# Patient Record
Sex: FEMALE | Race: WHITE | Employment: OTHER | URBAN - METROPOLITAN AREA
[De-identification: names, ages, dates, MRNs, and addresses within clinical notes are randomized per-mention and may not be internally consistent; named-entity substitution may affect disease eponyms.]

---

## 2018-02-20 PROBLEM — E55.9 VITAMIN D DEFICIENCY: Status: ACTIVE | Noted: 2018-02-20

## 2018-02-20 PROBLEM — E55.9 VITAMIN D DEFICIENCY: Chronic | Status: ACTIVE | Noted: 2018-02-20

## 2018-03-06 ENCOUNTER — OFFICE VISIT (OUTPATIENT)
Age: 68
End: 2018-03-06

## 2018-03-06 ENCOUNTER — PROCEDURE VISIT (OUTPATIENT)
Age: 68
End: 2018-03-06

## 2018-03-06 ENCOUNTER — HOSPITAL ENCOUNTER (OUTPATIENT)
Dept: GENERAL RADIOLOGY | Age: 68
Discharge: HOME | End: 2018-03-06
Attending: INTERNAL MEDICINE | Admitting: INTERNAL MEDICINE

## 2018-03-06 VITALS
BODY MASS INDEX: 17.71 KG/M2 | DIASTOLIC BLOOD PRESSURE: 78 MMHG | WEIGHT: 119.6 LBS | HEIGHT: 69 IN | SYSTOLIC BLOOD PRESSURE: 112 MMHG

## 2018-03-06 DIAGNOSIS — M81.0 OSTEOPOROSIS, POSTMENOPAUSAL: ICD-10-CM

## 2018-03-06 DIAGNOSIS — M81.0 OSTEOPOROSIS, POSTMENOPAUSAL: Primary | Chronic | ICD-10-CM

## 2018-03-06 DIAGNOSIS — M81.0 OSTEOPOROSIS, POSTMENOPAUSAL: Primary | ICD-10-CM

## 2018-03-06 DIAGNOSIS — E55.9 VITAMIN D DEFICIENCY: ICD-10-CM

## 2018-03-06 PROCEDURE — 1123F ACP DISCUSS/DSCN MKR DOCD: CPT | Performed by: INTERNAL MEDICINE

## 2018-03-06 PROCEDURE — G8419 CALC BMI OUT NRM PARAM NOF/U: HCPCS | Performed by: INTERNAL MEDICINE

## 2018-03-06 PROCEDURE — G8427 DOCREV CUR MEDS BY ELIG CLIN: HCPCS | Performed by: INTERNAL MEDICINE

## 2018-03-06 PROCEDURE — 77080 DXA BONE DENSITY AXIAL: CPT | Performed by: INTERNAL MEDICINE

## 2018-03-06 PROCEDURE — 1036F TOBACCO NON-USER: CPT | Performed by: INTERNAL MEDICINE

## 2018-03-06 PROCEDURE — 99214 OFFICE O/P EST MOD 30 MIN: CPT | Performed by: INTERNAL MEDICINE

## 2018-03-06 PROCEDURE — 3014F SCREEN MAMMO DOC REV: CPT | Performed by: INTERNAL MEDICINE

## 2018-03-06 PROCEDURE — G8399 PT W/DXA RESULTS DOCUMENT: HCPCS | Performed by: INTERNAL MEDICINE

## 2018-03-06 PROCEDURE — 3017F COLORECTAL CA SCREEN DOC REV: CPT | Performed by: INTERNAL MEDICINE

## 2018-03-06 PROCEDURE — 1090F PRES/ABSN URINE INCON ASSESS: CPT | Performed by: INTERNAL MEDICINE

## 2018-03-06 PROCEDURE — 4040F PNEUMOC VAC/ADMIN/RCVD: CPT | Performed by: INTERNAL MEDICINE

## 2018-03-06 PROCEDURE — G8484 FLU IMMUNIZE NO ADMIN: HCPCS | Performed by: INTERNAL MEDICINE

## 2018-03-06 NOTE — PROGRESS NOTES
[facility]) Osteoporosis and [address] [place] [place] [address]   Phone [phone]  Fax [phone]    NAME: Tony Jones   : 1950   STUDY DATE: 2018     REFERRING PROVIDER: Mary Betancourt MD    INDICATION(S) FOR PERFORMING THE STUDY:  osteoporosis, age-related (M81.0)    CLINICAL INFORMATION PROVIDED BY THE PATIENT:  55-year-old woman. She started natural menopause at age 60. No history of fragility fractures. No long-term corticosteroid use. Family history of osteoporosis (mother). EQUIPMENT: Hologic Discovery. POSITIONING: Good. REGIONS OF INTEREST: Correct. ARTIFACTS: None. STUDY VALID? Yes. Spine BMD is spuriously high because of generalized degenerative change; L3 and L4 were deleted. T-scores  Initial study: 2002* spine L1-L2 -0.4 Lowest hip (left total hip)  0.0   Current study: 2018 spine L1-L2 -2.6 Lowest hip (left fem. neck) -1.9     The table below shows bone mineral density (grams per cm squared). This is the appropriate measure for comparing serial scans. A change declared significant is based a least significant change figure derived from multiple precision studies done at our center according to the ISCD protocol. PA spine Proximal Femur (left)   Date L1-L2 Fem. neck Trochanter Total hip   2002* 0.936  0.846 0.649 0.940   2009 0.785 0.740 0.573 0.807   2012 0.744   0.685 0.542 0.742   2013 0.728 0.658 0.526 0.753   07/15/2014 0.719 0.651 0.508 0.710   2015 0.733 0.650 0.501 0.713   2018 0.693 0.639 0.496 0.707   *Done with [employer] equipment, BMD results converted to Hologic units    IMPRESSION:  BONE DENSITY IS LOW, CONSISTENT WITH OSTEOPOROSIS. BETWEEN  AND , BMD DECREASED IN THE SPINE BUT DID NOT CHANGE SIGNIFICANTLY IN THE LEFT HIP.       _________________________________________________   Funmi Mercer MD, Director, [facility]) Osteoporosis and Bone Health Services

## 2018-03-06 NOTE — PROGRESS NOTES
[facility]) Physicians Osteoporosis and [address] [facility]  [facility] [address], [address]  Phone [phone]  Fax [phone]    NAME: Rosa Hernández  : 1950  CONSULT DATE: 2012  MOST RECENT VISIT: 2015  TODAYS DATE: 2018    Labs @ [facility] 2018    PROBLEMS. Osteoporosis by DXA 2012, lowest T-score -2.5 in the spine      Family history of osteoporosis, mother with fractured pelvis and arm    BMD decreased 0920-7417, further 2965-8631 and further 2418-2835 at all sites measured    No fractures  Vitamin D deficiency, desirable 25-OH D is 30-60 ng/mL    29 ng/mL 2014    53 ng/mL 2018  Natural menopause age 60 ()  Breast cancer diagnosed , mastectomy  Medullary sponge kidney suspected  HNPP (decreased myelin)    CURRENT MANAGEMENT FOR OSTEOPOROSIS. Calcium, diet 1200 mg/d    300 mg from low calcium foods, 900 mg from oat milk  Vitamin D, with calcium 333 IU/d, multivitamin 800 IU/d, supplement  1000 IU/d = 2133 IU/d  Exercise, active with daily activities  Pharmacologic therapy, none    PREVIOUS MEDICATIONS FOR OSTEOPOROSIS. None    OTHER CURRENT MEDICATIONS. None  OTC MEDICATIONS. Krell oil    CHIEF COMPLAINT. Here for followup of osteoporosis. No new signs or symptoms. INTERVAL HISTORY. See problem list for chronic/inactive conditions. No falls, near-falls or fractures. Feels well overall. FOR FULL DETAILS OF FAMILY HISTORY, PAST MEDICAL AND SURGICAL HISTORY, SOCIAL HISTORY, AND REVIEW OF SYSTEMS, SEE PATIENT QUESTIONNAIRE OF  DATE. PHYSICAL EXAMINATION. GENERAL. Well-nourished, well-developed, normally proportioned adult. MUSCULOSKELETAL. Spinal contours are normal.  No spine tenderness to palpation or percussion. Two finger spaces between ribs and pelvis. No joint deformity. No edema. Gait steady without assistance. NEUROLOGICAL. Able to rise from chair without using arms.  No apparent focal motor or

## 2019-04-08 ENCOUNTER — OFFICE VISIT (OUTPATIENT)
Dept: ENDOCRINOLOGY | Age: 69
End: 2019-04-08
Payer: MEDICARE

## 2019-04-08 ENCOUNTER — HOSPITAL ENCOUNTER (OUTPATIENT)
Dept: GENERAL RADIOLOGY | Age: 69
Discharge: HOME | End: 2019-04-08
Payer: MEDICARE

## 2019-04-08 ENCOUNTER — PROCEDURE VISIT (OUTPATIENT)
Dept: ENDOCRINOLOGY | Age: 69
End: 2019-04-08
Payer: MEDICARE

## 2019-04-08 VITALS
HEIGHT: 69 IN | DIASTOLIC BLOOD PRESSURE: 61 MMHG | BODY MASS INDEX: 17.8 KG/M2 | SYSTOLIC BLOOD PRESSURE: 100 MMHG | WEIGHT: 120.2 LBS

## 2019-04-08 DIAGNOSIS — M81.0 OSTEOPOROSIS, POSTMENOPAUSAL: ICD-10-CM

## 2019-04-08 DIAGNOSIS — M81.0 OSTEOPOROSIS, POSTMENOPAUSAL: Primary | ICD-10-CM

## 2019-04-08 DIAGNOSIS — M81.0 OSTEOPOROSIS, POSTMENOPAUSAL: Chronic | ICD-10-CM

## 2019-04-08 DIAGNOSIS — E55.9 VITAMIN D DEFICIENCY: ICD-10-CM

## 2019-04-08 PROCEDURE — 99214 OFFICE O/P EST MOD 30 MIN: CPT | Performed by: INTERNAL MEDICINE

## 2019-04-08 PROCEDURE — 1036F TOBACCO NON-USER: CPT | Performed by: INTERNAL MEDICINE

## 2019-04-08 PROCEDURE — 1123F ACP DISCUSS/DSCN MKR DOCD: CPT | Performed by: INTERNAL MEDICINE

## 2019-04-08 PROCEDURE — 77080 DXA BONE DENSITY AXIAL: CPT

## 2019-04-08 PROCEDURE — G8399 PT W/DXA RESULTS DOCUMENT: HCPCS | Performed by: INTERNAL MEDICINE

## 2019-04-08 PROCEDURE — G8427 DOCREV CUR MEDS BY ELIG CLIN: HCPCS | Performed by: INTERNAL MEDICINE

## 2019-04-08 PROCEDURE — 77080 DXA BONE DENSITY AXIAL: CPT | Performed by: INTERNAL MEDICINE

## 2019-04-08 PROCEDURE — 4040F PNEUMOC VAC/ADMIN/RCVD: CPT | Performed by: INTERNAL MEDICINE

## 2019-04-08 PROCEDURE — 1090F PRES/ABSN URINE INCON ASSESS: CPT | Performed by: INTERNAL MEDICINE

## 2019-04-08 PROCEDURE — 3017F COLORECTAL CA SCREEN DOC REV: CPT | Performed by: INTERNAL MEDICINE

## 2019-04-08 PROCEDURE — G8419 CALC BMI OUT NRM PARAM NOF/U: HCPCS | Performed by: INTERNAL MEDICINE

## 2019-04-08 NOTE — PROGRESS NOTES
[facility]) Physicians Osteoporosis and [address]  [address]  [address], [address]  Phone [phone]  Fax [phone]    NAME: Brandy Sanchez  : 1950  CONSULT DATE: 2012  MOST RECENT VISIT: 2018  TODAYS DATE: 2019    Labs @ [facility] 2018    PROBLEMS. Osteoporosis by DXA 2012, lowest T-score -2.5 in the spine      Family history of osteoporosis, mother with fractured pelvis and arm    BMD decreased 6617-0876, further 1871-4113 and further 8854-4542 at all sites measured    No fractures  Vitamin D deficiency, desirable 25-OH D is 30-60 ng/mL    29 ng/mL 2014    53 ng/mL 2018    48 ng/mL 2018  Natural menopause age 60 ()  Breast cancer diagnosed , mastectomy  Medullary sponge kidney suspected  HNPP (decreased myelin)    CURRENT MANAGEMENT FOR OSTEOPOROSIS. Calcium, diet 1200 mg/d    300 mg from low calcium foods, 900 mg from oat milk  Vitamin D, with calcium 333 IU/d, multivitamin 800 IU/d, supplement  1000 IU/d = 2133 IU/d  Exercise, active with daily activities  Pharmacologic therapy, none    PREVIOUS MEDICATIONS FOR OSTEOPOROSIS. None    OTHER CURRENT MEDICATIONS. None  OTC MEDICATIONS. Krell oil    CHIEF COMPLAINT. Here for followup of osteoporosis. No new signs or symptoms. INTERVAL HISTORY. See problem list for chronic/inactive conditions. No falls, near-falls or fractures. Feels well overall. Her   in 2018 - hemorrhagic stroke during a [facility]) river cruise. She has good family support and seems to have adjusted well. FOR FULL DETAILS OF FAMILY HISTORY, PAST MEDICAL AND SURGICAL HISTORY, SOCIAL HISTORY, AND REVIEW OF SYSTEMS, SEE PATIENT QUESTIONNAIRE OF TODAYS DATE. PHYSICAL EXAMINATION. GENERAL. Well-nourished, well-developed, normally proportioned adult. MUSCULOSKELETAL. Spinal contours are normal.  No spine tenderness to palpation or percussion.    Two finger spaces between ribs and pelvis. No joint deformity. No edema. Gait steady without assistance. NEUROLOGICAL. Able to rise from chair without using arms. No apparent focal motor or sensory deficit. Reflexes brisk and symmetric. Coordination appears normal.       BONE DENSITY. Most recent done here using Hologic equipment. T T-scores  Initial study: 08/18/2002* spine L1-L2 -0.4 Lowest hip (left total hip)  0.0   Current study: 04/08/2019 spine L1-L2 -2.6 Lowest hip (left fem. neck) -1.9     The table below shows bone mineral density (grams per cm squared). This is the appropriate measure for comparing serial scans. A change declared significant is based a least significant change figure derived from multiple precision studies done at our center according to the ISCD protocol. PA spine Proximal Femur (left)   Date L1-L2 Fem. neck Trochanter Total hip   08/18/2002* 0.936  0.846 0.649 0.940   01/09/2009 0.785 0.740 0.573 0.807   02/14/2012 0.744   0.685 0.542 0.742   07/09/2013 0.728 0.658 0.526 0.753   07/15/2014 0.719 0.651 0.508 0.710   07/21/2015 0.733 0.650 0.501 0.713   03/06/2018 0.693 0.639 0.496 0.707   04/08/2019 0.653 0.645 0.488 0.699   *Done with [employer] equipment, BMD results converted to Hologic units    IMPRESSION:  BONE DENSITY IS LOW, CONSISTENT WITH OSTEOPOROSIS. SINCE THE PREVIOUS DXA, BMD DECREASED IN THE SPINE BUT DID NOT CHANGE SIGNIFICANTLY IN THE LEFT HIP. Imaging: DXA printouts reviewed. Labs.  03/2014, Ca 9.1, Cr 0.8. 02/2018 Ca 9.1 Cr 0.6. 12/2018 Ca 9.3 Cr 0.7. ASSESSMENT. Osteoporosis with bone density lower than desirable due to her family history of osteoporosis; importantly she has had no fractures. Hip BMD has been stable but spine decreased 7624-4833 and 3344-4532. .    Vitamin D deficiency has been corrected. PLANS. We discussed pharmacologic therapy (alendronate, Reclast and Prolia). She selected Prolia. We will make the necessary arrangements.       Return with DXA in 1 year.    I spent 25 minutes face to face with this patient. Over 50% of that time was spent on counseling and care coordination. See assessment and plan for counseling and care coordination details. Oliver Mercer MD, Director, [facility]) Osteoporosis and Bone Health Services    CC: Brandon Will MD

## 2019-04-11 ENCOUNTER — NURSE ONLY (OUTPATIENT)
Dept: ENDOCRINOLOGY | Age: 69
End: 2019-04-11
Payer: MEDICARE

## 2019-04-11 DIAGNOSIS — M81.0 OSTEOPOROSIS, POSTMENOPAUSAL: Chronic | ICD-10-CM

## 2019-04-11 PROCEDURE — 96372 THER/PROPH/DIAG INJ SC/IM: CPT | Performed by: INTERNAL MEDICINE

## 2019-10-16 ENCOUNTER — NURSE ONLY (OUTPATIENT)
Dept: ENDOCRINOLOGY | Age: 69
End: 2019-10-16
Payer: MEDICARE

## 2019-10-16 DIAGNOSIS — M81.0 OSTEOPOROSIS, POSTMENOPAUSAL: Chronic | ICD-10-CM

## 2019-10-16 PROCEDURE — 96372 THER/PROPH/DIAG INJ SC/IM: CPT | Performed by: INTERNAL MEDICINE

## 2020-04-14 ENCOUNTER — VIRTUAL VISIT (OUTPATIENT)
Dept: ENDOCRINOLOGY | Age: 70
End: 2020-04-14
Payer: MEDICARE

## 2020-04-14 PROBLEM — Z51.81 MEDICATION MONITORING ENCOUNTER: Status: ACTIVE | Noted: 2020-04-14

## 2020-04-14 PROCEDURE — 1090F PRES/ABSN URINE INCON ASSESS: CPT | Performed by: INTERNAL MEDICINE

## 2020-04-14 PROCEDURE — G8427 DOCREV CUR MEDS BY ELIG CLIN: HCPCS | Performed by: INTERNAL MEDICINE

## 2020-04-14 PROCEDURE — G8399 PT W/DXA RESULTS DOCUMENT: HCPCS | Performed by: INTERNAL MEDICINE

## 2020-04-14 PROCEDURE — 4040F PNEUMOC VAC/ADMIN/RCVD: CPT | Performed by: INTERNAL MEDICINE

## 2020-04-14 PROCEDURE — 3017F COLORECTAL CA SCREEN DOC REV: CPT | Performed by: INTERNAL MEDICINE

## 2020-04-14 PROCEDURE — 1123F ACP DISCUSS/DSCN MKR DOCD: CPT | Performed by: INTERNAL MEDICINE

## 2020-04-14 PROCEDURE — 99213 OFFICE O/P EST LOW 20 MIN: CPT | Performed by: INTERNAL MEDICINE

## 2020-04-14 RX ORDER — IBUPROFEN 200 MG
200 TABLET ORAL EVERY 6 HOURS PRN
COMMUNITY

## 2020-04-14 NOTE — PROGRESS NOTES
decreased 5992-8019 and 0319-0498. She is doing well with Prolia started 04/2019. Vitamin D deficiency has been corrected. PLANS. OK to give Prolia today and continue treatment with Prolia 60 mg SQ twice yearly. Will arrange for DXA when the schedule opens up. Yuliait. Pursuant to the emergency declaration under the [address] [address] [facility] authority and the [employer] Act this Virtual Visit was conducted, with patient's consent, to reduce the patient's risk of exposure to COVID-19 and provide necessary continuity of care for an established patient. Services were provided through a synchronous video link to substitute for an in-person clinic visit. Because this was a Virtual Visit, evaluation of the following organ systems was limited: Vitals, Constitutional, EENT, Resp, CV, GI, , MS, Neuro, Skin. Heme. Lymph, Imm. Carlie Escobar was at home. Provider was at home. No one else was involved. Carlie Escobar is aware that that he/she may receive a bill for this service, depending on her insurance coverage, and has provided verbal consent to proceed. The patient (and/or legal guardian) has also been advised to contact this office for worsening conditions or problems, and seek emergency medical treatment and/or call 911 if deemed necessary. Physical exam in today's note represents findings from the last actual visit. Time: I spent 23 minutes on this visit. Over 50% of that time was spent on counseling and care coordination. See assessment and plan for counseling and care coordination details. Moses Mercer MD, Director, [facility]) Osteoporosis and Bone Health Services    CC: Shlomo Olivares MD

## 2020-05-13 ENCOUNTER — NURSE ONLY (OUTPATIENT)
Dept: ENDOCRINOLOGY | Age: 70
End: 2020-05-13
Payer: MEDICARE

## 2020-05-13 PROCEDURE — 96372 THER/PROPH/DIAG INJ SC/IM: CPT | Performed by: INTERNAL MEDICINE

## 2020-06-29 ENCOUNTER — HOSPITAL ENCOUNTER (OUTPATIENT)
Dept: GENERAL RADIOLOGY | Age: 70
Discharge: HOME | End: 2020-06-29
Payer: MEDICARE

## 2020-06-29 ENCOUNTER — PROCEDURE VISIT (OUTPATIENT)
Dept: ENDOCRINOLOGY | Age: 70
End: 2020-06-29

## 2020-06-29 ENCOUNTER — OFFICE VISIT (OUTPATIENT)
Dept: ENDOCRINOLOGY | Age: 70
End: 2020-06-29
Payer: MEDICARE

## 2020-06-29 VITALS
BODY MASS INDEX: 18.93 KG/M2 | DIASTOLIC BLOOD PRESSURE: 69 MMHG | HEIGHT: 69 IN | SYSTOLIC BLOOD PRESSURE: 103 MMHG | WEIGHT: 127.8 LBS

## 2020-06-29 PROCEDURE — 3017F COLORECTAL CA SCREEN DOC REV: CPT | Performed by: INTERNAL MEDICINE

## 2020-06-29 PROCEDURE — G8427 DOCREV CUR MEDS BY ELIG CLIN: HCPCS | Performed by: INTERNAL MEDICINE

## 2020-06-29 PROCEDURE — 77080 DXA BONE DENSITY AXIAL: CPT | Performed by: INTERNAL MEDICINE

## 2020-06-29 PROCEDURE — G8420 CALC BMI NORM PARAMETERS: HCPCS | Performed by: INTERNAL MEDICINE

## 2020-06-29 PROCEDURE — 1036F TOBACCO NON-USER: CPT | Performed by: INTERNAL MEDICINE

## 2020-06-29 PROCEDURE — 1090F PRES/ABSN URINE INCON ASSESS: CPT | Performed by: INTERNAL MEDICINE

## 2020-06-29 PROCEDURE — 99214 OFFICE O/P EST MOD 30 MIN: CPT | Performed by: INTERNAL MEDICINE

## 2020-06-29 PROCEDURE — 4040F PNEUMOC VAC/ADMIN/RCVD: CPT | Performed by: INTERNAL MEDICINE

## 2020-06-29 PROCEDURE — 77080 DXA BONE DENSITY AXIAL: CPT

## 2020-06-29 PROCEDURE — G8399 PT W/DXA RESULTS DOCUMENT: HCPCS | Performed by: INTERNAL MEDICINE

## 2020-06-29 PROCEDURE — 1123F ACP DISCUSS/DSCN MKR DOCD: CPT | Performed by: INTERNAL MEDICINE

## 2020-06-29 NOTE — RESULT ENCOUNTER NOTE
[facility]) Osteoporosis and [address] [address] [facility] [place] [address]   Phone [phone]  Fax [phone]    NAME: Cecily Dunlap   : 1950   STUDY DATE: 2020     REFERRING PROVIDER: Dimple Wayne MD    INDICATION(S) FOR PERFORMING THE STUDY:  osteoporosis, age-related (M81.0)    CLINICAL INFORMATION PROVIDED BY THE PATIENT:  33-year-old woman. She started natural menopause at age 60. No history of fragility fractures. No long-term corticosteroid use. Current treatment is Prolia started 2019. Family history of osteoporosis (mother). EQUIPMENT: Hologic Discovery. POSITIONING: Good. REGIONS OF INTEREST: Correct. ARTIFACTS: None. STUDY VALID? Yes. Spine BMD is spuriously high because of generalized degenerative change; L3 and L4 were deleted. T-scores  Initial study: 2002* spine L1-L2 -0.4 Lowest hip (left total hip)  0.0   Current study: 2020 spine L1-L2 -2.2 Lowest hip (left fem. neck) -1.9     The table below shows bone mineral density (grams per cm squared). This is the appropriate measure for comparing serial scans. A change declared significant is based a least significant change figure derived from multiple precision studies done at our center according to the ISCD protocol. PA spine Proximal Femur (left)   Date L1-L2 Fem. neck Trochanter Total hip   2002* 0.936  0.846 0.649 0.940   2009 0.785 0.740 0.573 0.807   2012 0.744   0.685 0.542 0.742   2013 0.728 0.658 0.526 0.753   07/15/2014 0.719 0.651 0.508 0.710   2015 0.733 0.650 0.501 0.713   2018 0.693 0.639 0.496 0.707   2019 0.653 0.645 0.488 0.699   2020 0.734 0.638 0.508 0.712   *Done with [employer] equipment, BMD results converted to Hologic units    IMPRESSION:  BONE DENSITY IS LOW, CONSISTENT WITH OSTEOPOROSIS. SINCE THE PREVIOUS DXA, BMD INCREASED IN THE SPINE AND DID NOT CHANGE SIGNIFICANTLY IN THE LEFT HIP.

## 2020-06-29 NOTE — PROGRESS NOTES
[facility] ([facility]) Physicians Osteoporosis and [address]  [address] [facility], [address]  Phone [phone]  Fax [phone]    NAME: Javan Najjar  : 1950  CONSULT DATE: 2012  MOST RECENT VISIT: 2020 VV  TODAYS DATE: 2020    Labs @ [facility] 2020    PROBLEMS. Osteoporosis by DXA 2012, lowest T-score -2.5 in the spine      Family history of osteoporosis, mother with fractured pelvis and arm    BMD decreased 9349-2639, further 8681-0961 and further 4955-3142 at all sites measured    No fractures  Vitamin D deficiency, desirable 25-OH D is 30-60 ng/mL    29 ng/mL 2014    53 ng/mL 2018    48 ng/mL 2018    53 ng/mL 2019    51 ng/mL 2020  Natural menopause age 60 ()  Breast cancer diagnosed , mastectomy  Medullary sponge kidney suspected  HNPP (decreased myelin)    CURRENT MANAGEMENT FOR OSTEOPOROSIS. Calcium, diet 1200 mg/d    300 mg from low calcium foods, 900 mg from oat milk, kale, occ cheese, yogurt  Vitamin D, 1000 IU/d   Exercise, active with daily activities  Pharmacologic therapy, Prolia 60 mg SQ twice yearly started 2019. PREVIOUS MEDICATIONS FOR OSTEOPOROSIS. None    OTHER CURRENT MEDICATIONS. None  OTC MEDICATIONS. Krill oil    CHIEF COMPLAINT. Here for followup of osteoporosis. No new signs or symptoms. INTERVAL HISTORY. See problem list for chronic/inactive conditions. No falls, near-falls or fractures. Feels well overall. She received Prolia without side effects. FOR FULL DETAILS OF FAMILY HISTORY, PAST MEDICAL AND SURGICAL HISTORY, SOCIAL HISTORY, AND REVIEW OF SYSTEMS, SEE PATIENT QUESTIONNAIRE OF  DATE. PHYSICAL EXAMINATION. GENERAL. Well-nourished, well-developed, normally proportioned adult. MUSCULOSKELETAL. Spinal contours are normal.  No spine tenderness to palpation or percussion. Two finger spaces between ribs and pelvis. No joint deformity. No edema.  Gait steady without minutes face to face with this patient. Over 50% of that time was spent on counseling and care coordination. See assessment and plan for counseling and care coordination details. Rosangela Mercer MD, Director, [facility]) Osteoporosis and Bone Health Services    CC: Debbie Oswald MD

## 2020-06-29 NOTE — RESULT ENCOUNTER NOTE
[facility]) Osteoporosis and [address] [place] [address]., [address] [place] [address]   Phone [phone]  Fax [phone]    NAME: Christina Carrasco   : 1950   STUDY DATE: 2020     REFERRING PROVIDER: Monika Wu MD    INDICATION(S) FOR PERFORMING THE STUDY:  osteoporosis, age-related (M81.0)    CLINICAL INFORMATION PROVIDED BY THE PATIENT:  64-year-old woman. She started natural menopause at age 60. No history of fragility fractures. No long-term corticosteroid use. Current treatment is Prolia started 2019. Family history of osteoporosis (mother). EQUIPMENT: Hologic Discovery. POSITIONING: Good. REGIONS OF INTEREST: Correct. ARTIFACTS: None. STUDY VALID? Yes. Spine BMD is spuriously high because of generalized degenerative change; L3 and L4 were deleted. T-scores  Initial study: 2002* spine L1-L2 -0.4 Lowest hip (left total hip)  0.0   Current study: 2020 spine L1-L2 -2.2 Lowest hip (left fem. neck) -1.9     The table below shows bone mineral density (grams per cm squared). This is the appropriate measure for comparing serial scans. A change declared significant is based a least significant change figure derived from multiple precision studies done at our center according to the ISCD protocol. PA spine Proximal Femur (left)   Date L1-L2 Fem. neck Trochanter Total hip   2002* 0.936  0.846 0.649 0.940   2009 0.785 0.740 0.573 0.807   2012 0.744   0.685 0.542 0.742   2013 0.728 0.658 0.526 0.753   07/15/2014 0.719 0.651 0.508 0.710   2015 0.733 0.650 0.501 0.713   2018 0.693 0.639 0.496 0.707   2019 0.653 0.645 0.488 0.699   2020 0.734 0.638 0.508 0.712   *Done with [employer] equipment, BMD results converted to Hologic units    IMPRESSION:  BONE DENSITY IS LOW, CONSISTENT WITH OSTEOPOROSIS. SINCE THE PREVIOUS DXA, BMD INCREASED IN THE SPINE AND DID NOT CHANGE SIGNIFICANTLY IN THE LEFT HIP. _________________________________________________   Junaid Mercer MD, Director, [facility] ([facility]) Osteoporosis and [address] [facility]

## 2020-11-18 ENCOUNTER — NURSE ONLY (OUTPATIENT)
Dept: ENDOCRINOLOGY | Age: 70
End: 2020-11-18
Payer: MEDICARE

## 2020-11-18 PROCEDURE — 96372 THER/PROPH/DIAG INJ SC/IM: CPT | Performed by: INTERNAL MEDICINE

## 2021-07-19 ENCOUNTER — OFFICE VISIT (OUTPATIENT)
Dept: ENDOCRINOLOGY | Age: 71
End: 2021-07-19
Payer: MEDICARE

## 2021-07-19 ENCOUNTER — PROCEDURE VISIT (OUTPATIENT)
Dept: ENDOCRINOLOGY | Age: 71
End: 2021-07-19

## 2021-07-19 ENCOUNTER — HOSPITAL ENCOUNTER (OUTPATIENT)
Dept: GENERAL RADIOLOGY | Age: 71
Discharge: HOME | End: 2021-07-19
Payer: MEDICARE

## 2021-07-19 VITALS
DIASTOLIC BLOOD PRESSURE: 70 MMHG | BODY MASS INDEX: 18.46 KG/M2 | SYSTOLIC BLOOD PRESSURE: 104 MMHG | WEIGHT: 121.8 LBS | HEIGHT: 68 IN

## 2021-07-19 DIAGNOSIS — Z51.81 MEDICATION MONITORING ENCOUNTER: ICD-10-CM

## 2021-07-19 DIAGNOSIS — M81.0 OSTEOPOROSIS, POSTMENOPAUSAL: Chronic | ICD-10-CM

## 2021-07-19 DIAGNOSIS — M81.0 OSTEOPOROSIS, POSTMENOPAUSAL: ICD-10-CM

## 2021-07-19 DIAGNOSIS — M81.0 OSTEOPOROSIS, POSTMENOPAUSAL: Primary | ICD-10-CM

## 2021-07-19 DIAGNOSIS — E55.9 VITAMIN D DEFICIENCY: ICD-10-CM

## 2021-07-19 PROCEDURE — 1036F TOBACCO NON-USER: CPT | Performed by: INTERNAL MEDICINE

## 2021-07-19 PROCEDURE — G8419 CALC BMI OUT NRM PARAM NOF/U: HCPCS | Performed by: INTERNAL MEDICINE

## 2021-07-19 PROCEDURE — 4040F PNEUMOC VAC/ADMIN/RCVD: CPT | Performed by: INTERNAL MEDICINE

## 2021-07-19 PROCEDURE — 99214 OFFICE O/P EST MOD 30 MIN: CPT | Performed by: INTERNAL MEDICINE

## 2021-07-19 PROCEDURE — 1123F ACP DISCUSS/DSCN MKR DOCD: CPT | Performed by: INTERNAL MEDICINE

## 2021-07-19 PROCEDURE — G8427 DOCREV CUR MEDS BY ELIG CLIN: HCPCS | Performed by: INTERNAL MEDICINE

## 2021-07-19 PROCEDURE — 77080 DXA BONE DENSITY AXIAL: CPT

## 2021-07-19 PROCEDURE — 1090F PRES/ABSN URINE INCON ASSESS: CPT | Performed by: INTERNAL MEDICINE

## 2021-07-19 PROCEDURE — G8399 PT W/DXA RESULTS DOCUMENT: HCPCS | Performed by: INTERNAL MEDICINE

## 2021-07-19 PROCEDURE — 3017F COLORECTAL CA SCREEN DOC REV: CPT | Performed by: INTERNAL MEDICINE

## 2021-07-19 PROCEDURE — 96372 THER/PROPH/DIAG INJ SC/IM: CPT | Performed by: INTERNAL MEDICINE

## 2021-07-19 PROCEDURE — 77080 DXA BONE DENSITY AXIAL: CPT | Performed by: INTERNAL MEDICINE

## 2021-07-19 RX ORDER — BROMFENAC SODIUM 0.7 MG/ML
SOLUTION/ DROPS OPHTHALMIC
COMMUNITY

## 2021-07-19 NOTE — PROGRESS NOTES
[facility] [place] ([facility]) Physicians Osteoporosis and [address] [facility] [address]  [address] [facility] [address], [address]  Phone [phone]  Fax [phone]    NAME: Quang Boland  : 1950  CONSULT DATE: 2012  MOST RECENT VISIT: 2020  TODAYS DATE: 2021    Labs @ [facility] 2020    PROBLEMS. Osteoporosis by DXA 2012, lowest T-score -2.5 in the spine      Family history of osteoporosis, mother with fractured pelvis and arm    BMD decreased 4246-3992, further 8475-5765 and further 6519-8031 at all sites measured    No fractures  Vitamin D deficiency, desirable 25-OH D is 30-60 ng/mL    29 ng/mL 2014    53 ng/mL 2018    48 ng/mL 2018    53 ng/mL 2019    51 ng/mL 2020  Natural menopause age 60 ()  Breast cancer diagnosed , mastectomy  Medullary sponge kidney suspected  HNPP (decreased myelin)    CURRENT MANAGEMENT FOR OSTEOPOROSIS. Calcium, diet 1200 mg/d    300 mg from low calcium foods, 900 mg from oat milk, kale, occ cheese, yogurt  Vitamin D, 1000 IU/d   Exercise, active with daily activities  Pharmacologic therapy, Prolia 60 mg SQ twice yearly started 2019. PREVIOUS MEDICATIONS FOR OSTEOPOROSIS. None    OTHER CURRENT MEDICATIONS. None  OTC MEDICATIONS. Krill oil    CHIEF COMPLAINT. Here for followup of osteoporosis. No new signs or symptoms. INTERVAL HISTORY. See problem list for chronic/inactive conditions. No falls, near-falls or fractures. Feels well overall. She received Prolia without side effects. FOR FULL DETAILS OF FAMILY HISTORY, PAST MEDICAL AND SURGICAL HISTORY, SOCIAL HISTORY, AND REVIEW OF SYSTEMS, SEE PATIENT QUESTIONNAIRE OF TODAYS DATE. PHYSICAL EXAMINATION. GENERAL. Well-nourished, well-developed, normally proportioned adult. MUSCULOSKELETAL. Spinal contours are normal.  No spine tenderness to palpation or percussion. Two finger spaces between ribs and pelvis. No joint deformity. No edema.  Gait steady without assistance. NEUROLOGICAL. Able to rise from chair without using arms. No apparent focal motor or sensory deficit. Reflexes brisk and symmetric. Coordination appears normal.     BONE DENSITY. Most recent done here using Hologic equipment. T-scores  Initial study: 08/18/2002* spine L1-L2 -0.4 Lowest hip (left total hip)  0.0   Current study: 07/19/2021 spine L1-L2 -2.4 Lowest hip (left fem. neck) -1.8     The table below shows bone mineral density (grams per cm squared). This is the appropriate measure for comparing serial scans. A change declared significant is based a least significant change figure derived from multiple precision studies done at our center according to the ISCD protocol. PA spine Proximal Femur (left)   Date L1-L2 Fem. neck Trochanter Total hip   08/18/2002* 0.936  0.846 0.649 0.940   01/09/2009 0.785 0.740 0.573 0.807   02/14/2012 0.744   0.685 0.542 0.742   07/21/2015 0.733 0.650 0.501 0.713   03/06/2018 0.693 0.639 0.496 0.707   04/08/2019 0.653 0.645 0.488 0.699   06/29/2020 0.734 0.638 0.508 0.712   07/19/2021 0.718 0.703 0.499 0.717   *Done with [employer] equipment, BMD results converted to Hologic units    IMPRESSION:  BONE DENSITY IS LOW, CONSISTENT WITH OSTEOPOROSIS. SINCE THE LAST DXA, BMD INCREASED IN THE FEMORAL NECK AND DID NOT CHANGE SIGNIFICANTLY AT OTHER SITES MEASURED. COMPARED WITH 2019, BEFORE STARTING PROLIA, BMD IS HIGHER NOW IN THE SPINE AND FEMORAL NECK. Imaging: DXA printouts reviewed. Labs.  03/2014, Ca 9.1, Cr 0.8. 02/2018 Ca 9.1 Cr 0.6. 12/2018 Ca 9.3 Cr 0.7. 03/2019 Ca 9.2 Cr 0.7.  0/2020 Ca 9.4 Cr 0.7. ASSESSMENT. Osteoporosis with bone density lower than desirable due to her family history of osteoporosis; importantly she has had no fractures. Hip BMD has been stable but spine decreased 0778-1606 and 8500-4822. She is doing well with Prolia started 04/2019. Vitamin D deficiency has been corrected. PLANS.  Continue treatment with Prolia 60 mg SQ twice yearly (next dose scheduled 11/18/2020). Return with DXA in 1 year. Time spent today: 30-40 minutes. Radha Mercer MD, Director, [facility]) Osteoporosis and Bone Health Services    CC: Farzaneh Linares MD

## 2021-07-19 NOTE — RESULT ENCOUNTER NOTE
CHANGE SIGNIFICANTLY AT OTHER SITES MEASURED. COMPARED WITH 2019, BEFORE STARTING PROLIA, BMD IS HIGHER NOW IN THE SPINE AND FEMORAL NECK.     _________________________________________________   Jorge Mercer MD, Director, [facility] ([facility]) Osteoporosis and [address] [facility]

## 2022-02-02 ENCOUNTER — NURSE ONLY (OUTPATIENT)
Dept: ENDOCRINOLOGY | Age: 72
End: 2022-02-02
Payer: MEDICARE

## 2022-02-02 DIAGNOSIS — M81.0 OSTEOPOROSIS, POSTMENOPAUSAL: Chronic | ICD-10-CM

## 2022-02-02 PROCEDURE — 96372 THER/PROPH/DIAG INJ SC/IM: CPT | Performed by: INTERNAL MEDICINE

## 2022-06-15 ENCOUNTER — HOSPITAL ENCOUNTER (EMERGENCY)
Age: 72
Discharge: HOME | End: 2022-06-15
Attending: EMERGENCY MEDICINE
Payer: MEDICARE

## 2022-06-15 VITALS
RESPIRATION RATE: 18 BRPM | WEIGHT: 122 LBS | HEART RATE: 84 BPM | SYSTOLIC BLOOD PRESSURE: 137 MMHG | TEMPERATURE: 97.7 F | DIASTOLIC BLOOD PRESSURE: 97 MMHG | OXYGEN SATURATION: 95 % | BODY MASS INDEX: 18.36 KG/M2

## 2022-06-15 DIAGNOSIS — S09.92XA INJURY OF NOSE, INITIAL ENCOUNTER: Primary | ICD-10-CM

## 2022-06-15 DIAGNOSIS — R04.0 EPISTAXIS: ICD-10-CM

## 2022-06-15 PROCEDURE — 99283 EMERGENCY DEPT VISIT LOW MDM: CPT

## 2022-06-15 PROCEDURE — 6370000000 HC RX 637 (ALT 250 FOR IP): Performed by: EMERGENCY MEDICINE

## 2022-06-15 RX ORDER — OXYMETAZOLINE HYDROCHLORIDE 0.05 G/100ML
2 SPRAY NASAL ONCE
Status: COMPLETED | OUTPATIENT
Start: 2022-06-15 | End: 2022-06-15

## 2022-06-15 RX ORDER — ONDANSETRON 4 MG/1
4 TABLET, ORALLY DISINTEGRATING ORAL ONCE
Status: COMPLETED | OUTPATIENT
Start: 2022-06-15 | End: 2022-06-15

## 2022-06-15 RX ADMIN — OXYMETAZOLINE HCL 2 SPRAY: 0.05 SPRAY NASAL at 14:00

## 2022-06-15 RX ADMIN — ONDANSETRON 4 MG: 4 TABLET, ORALLY DISINTEGRATING ORAL at 15:41

## 2022-06-15 ASSESSMENT — ENCOUNTER SYMPTOMS
VOMITING: 0
EYES NEGATIVE: 1
EYE REDNESS: 0
COLOR CHANGE: 1
FACIAL SWELLING: 1
EYE DISCHARGE: 0
BACK PAIN: 0
RESPIRATORY NEGATIVE: 1
PHOTOPHOBIA: 0
EYE PAIN: 0
ABDOMINAL PAIN: 0
NAUSEA: 0
SHORTNESS OF BREATH: 0

## 2022-06-15 ASSESSMENT — PAIN - FUNCTIONAL ASSESSMENT: PAIN_FUNCTIONAL_ASSESSMENT: NONE - DENIES PAIN

## 2022-06-15 NOTE — ED PROVIDER NOTES
[address] [facility]          ATTENDING PHYSICIAN NOTE       Date of evaluation: 6/15/2022    Chief Complaint     Epistaxis (states was hit with tree branch in nose, has nose bleed, no LOC or other injuries )      History of Present Illness     Damian Chisholm is a 66 y.o. female who presents to the emergency department with nose injury and nosebleed, which was traumatic in origin. The patient states that she was out trimming tree branches this morning. She was holding a small honeysuckle bush down with her foot and trimming branches off of it, when her foot slipped in the bush was released and a branch struck her in the face. She did not fall to the ground, denies loss of consciousness, had onset of pain in the area of the nasal bridge, and thereafter developed a nosebleed. She endorses mild pain localized around the area of the bridge of the nose, but denies any generalized headache, visual changes, nausea or vomiting, dizziness or lightheadedness, or other associated symptoms. She denies any other injuries or sources of pain relating to this event. Review of Systems     Review of Systems   Constitutional: Negative. Negative for activity change, appetite change, chills and fever. HENT: Positive for facial swelling and nosebleeds. Eyes: Negative. Negative for photophobia, pain, discharge, redness and visual disturbance. Respiratory: Negative. Negative for shortness of breath. Cardiovascular: Negative. Negative for chest pain. Gastrointestinal: Negative for abdominal pain, nausea and vomiting. Musculoskeletal: Negative for back pain and neck pain. Skin: Positive for color change. Bruising over the bridge of the nose   Neurological: Negative for dizziness, syncope, weakness, light-headedness, numbness and headaches. Hematological: Does not bruise/bleed easily. Psychiatric/Behavioral: The patient is nervous/anxious.         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 She has a past surgical history that includes Breast surgery and Colonoscopy. Her family history is not on file. She reports that she has never smoked. She has never used smokeless tobacco. She reports current alcohol use. She reports that she does not use drugs. Medications     Discharge Medication List as of 6/15/2022  4:52 PM      CONTINUE these medications which have NOT CHANGED    Details   NONFORMULARY nutrafol contains biotin, vit D, Vit a,c d, iodine zinc and soleniumHistorical Med      Multiple Vitamins-Minerals (OCUVITE EYE HEATLH GUMMIES) CHEW Take by mouthHistorical Med      conjugated estrogens (PREMARIN) 0.625 MG/GM vaginal cream Place vaginally daily, Vaginal, DAILY Starting Mon 3/7/2016, Historical Med      ibuprofen (ADVIL;MOTRIN) 200 MG tablet Take 200 mg by mouth every 6 hours as neededHistorical Med      PROLIA 60 MG/ML SOLN SC injection Inject 1 mL into the skin See Admin Instructions One dose every 6 months, Disp-1 Syringe, R-1, DAWPrint      Vitamin D (CHOLECALCIFEROL) 1000 UNITS CAPS capsule Take 1,000 Units by mouth daily Historical Med      Biotin 1000 MCG TABS Take  by mouth daily. Allergies     She is allergic to bee venom, chlorhexidine, iodine, nitrofurantoin, and sulfa drugs cross reactors. Physical Exam     INITIAL VITALS: BP: (!) 117/91, Temp: 97.7 °F (36.5 °C), Heart Rate: 86, Resp: 18, SpO2: 95 %     General: Well appearing, slender patient. Younger appearing than her stated age. She is somewhat anxious appearing, but pleasantly conversational, and in NAD. HEENT: Pupils are equal, round, and reactive to light. Extraocular muscles are intact without pain or diplopia.   Conjunctivae are clear and moist. No redness or drainage from the eyes.   There is a tiny superficial abrasion over the right side of the bridge of the nose, with developing edema and mild ecchymosis localized to the bridge of the nose. There is mild tenderness to palpation, without any crepitus palpable, and no obvious deformity. There is no tenderness to palpation over the superior or inferior orbital rims, along the maxilla and zygoma, or along the jawline. The patient feels that her teeth meet normally. There is slowly dripping blood from the nasopharynx, most prominent from the left naris. On intranasal examination, there is no septal deformity or septal hematoma. No septal laceration. There is a small amount of blood present in both nares. External auditory canals are normal bilaterally. There is a small serous effusion behind the right TM, more prominent serous effusion with air bubbles behind the left TM, but no purulence, bulging, or erythema, and maintained light reflex bilaterally. Neck: Supple, with full range of motion. No midline C-spine tenderness to palpation, crepitus, or step-offs. Back: No midline T or L spine tenderness to palpation, crepitus, or step-offs. Chest: Not tender to palpation. Cardiovascular: Normal S1-S2 without murmur rub or gallop. 2+ radial pulses bilaterally. Respiratory: Unlabored breathing with equal chest rise and fall. Lungs are clear to auscultation bilaterally. No adventitious lung sounds heard. Abdomen: Soft and nontender, without guarding or rebound tenderness. No masses or hepatosplenomegaly. Skin: Warm and dry, without rashes or ecchymoses, lacerations or abrasions. Neuro: Alert and oriented x3. No focal neurologic deficits are noted. Extremities: Warm and well-perfused, without clubbing, cyanosis, or edema. The patient moves all extremities equally. Psych: The patient's mood and affect are generally within normal limits for their presentation.       Diagnostic Results         RADIOLOGY:  No orders to display       LABS:   No results found for this visit on 06/15/22. RECENT VITALS:  BP: (!) 137/97, Temp: 97.7 °F (36.5 °C), Heart Rate: 84, Resp: 18, SpO2: 95 %     Procedures         ED Course     Nursing Notes, Past Medical Hx, Past Surgical Hx, Social Hx, Allergies, and Family Hx were reviewed. The patient was given the following medications:  Orders Placed This Encounter   Medications    oxymetazoline (AFRIN) 0.05 % nasal spray 2 spray    ondansetron (ZOFRAN-ODT) disintegrating tablet 4 mg       CONSULTS:  None    MEDICAL DECISION MAKING / ASSESSMENT / Grace Natasha is a 66 y.o. female, generally very healthy and active, who presents to the emergency department with nasal injury and epistaxis after she was struck in the bridge of the nose by a recoiling branch. She had no loss of consciousness and no signs or symptoms of intracranial injury on examination. She has fairly focal evidence of injury of the bridge of the nose, with a tiny superficial abrasion that does not require primary closure, and developing ecchymosis and edema, with mild tenderness to palpation but no deformity of the nose. She has no tenderness elsewhere in the face, full extraocular movements without diplopia or pain, and no evidence of facial injury aside from a likely nasal fracture. There is no septal hematoma or deformity, and no septal laceration. Her examination is consistent with a nasal bone fracture, but there are no evidence of other facial bone injuries, or indications for imaging at this time. The patient has notable epistaxis. A nasal clip was initially placed, although she still had dripping of blood down the back of her throat after this. Afrin was then instilled in the bilateral nostrils, and the clip was replaced for approximately 30 minutes.   The patient was given Zofran, as she was concerned that the blood in her stomach might make her feel nauseous, although ultimately she did not experience any nausea during her emergency department stay. The patient's nosebleed resolved. The nasal clip was removed. On examination of the nasal mucosa, no obvious bleeding vessel amenable to cautery was noted. The patient was observed for approximately an hour and had no return of nosebleed. She then ambulated up to the restroom, and had no return of bleeding after ambulation. Patient was observed for about 2-1/2 hours after the removal of the nasal clip, and when she had no return of nosebleed at that time, was considered stable for discharge. After the nasal clip had initially been placed, the patient did note a sensation of pressure and diminished hearing in her left ear. She appears to have a serous effusion, but no evidence of complication related to this. On further questioning, the patient notes that she has seasonal allergies, for which she sometimes takes allergy medication, and had noticed some runny nose in the last several days, and this is likely the source of her serous effusion. She was encouraged to continue to take her allergy medication at home. Patient notes that she has an ENT physician in the  system and was encouraged to follow-up in the next week or 2 for reevaluation of her presumed nasal bone fracture once the swelling had begun to improve. She had concerns that her otolaryngologist may not be able to see her in an expedited fashion, and was desirous of a referral to the local [facility] ENT group, which was provided for her. She was given self-care instructions for after a nasal fracture and traumatic nosebleed, and was encouraged to return to the emergency department should she have return of a nosebleed that did not resolve with 15 to 20 minutes of pressure. Clinical Impression     1. Injury of nose, initial encounter    2. Epistaxis        Disposition     PATIENT REFERRED TO:  Maddison Marcano DO  [address]  [employer]  [address] [facility] [phone]    Call in 1 day  to discuss your ER visit, and arrange a follow-up appointment      DISCHARGE MEDICATIONS:  Discharge Medication List as of 6/15/2022  4:52 PM          DISPOSITION Decision To Discharge    (Please note that portions of this note were completed with voice recognition software.   Efforts were made to edit the dictations but occasionally words are mis-transcribed.)     Jenna Guevara MD  06/15/22 [id number]

## 2022-06-15 NOTE — ED NOTES
Pt ambulated with steady gait and without use of assistive devices. Pt denies any lightheaded or dizziness. Pt has no bleeding from nose at that time.       Yaquelin Grossman RN  06/15/22 1409

## 2022-06-15 NOTE — ED NOTES
Pt states blood from nose is running down back of throat. Dr Jac Briggs aware.       Orville Angeles, RN  06/15/22 1300

## 2022-06-15 NOTE — ED NOTES
Pt arrives from home for nose bleed/nasal injury. Pt states she was trimming trees and one came back and hit her in the nose. Pt denies any pain at this time. Denies LOC or any other injuries. Pt alert, oriented and ambulatory upon arrival to ER.       Ariadna Flores RN  06/15/22 9034

## 2022-06-15 NOTE — ED NOTES
--Patient provided with discharge instructions and any prescriptions. --Instructions, dosing, and follow-up appointments reviewed with patient/family. No further questions or needs at this time. --Vital signs and patient stable upon discharge. --Patient ambulatory to lobby with friend.       Sylvia Alvardao RN  06/15/22 7343

## 2022-08-09 ENCOUNTER — PROCEDURE VISIT (OUTPATIENT)
Dept: ENDOCRINOLOGY | Age: 72
End: 2022-08-09

## 2022-08-09 ENCOUNTER — OFFICE VISIT (OUTPATIENT)
Dept: ENDOCRINOLOGY | Age: 72
End: 2022-08-09
Payer: MEDICARE

## 2022-08-09 ENCOUNTER — HOSPITAL ENCOUNTER (OUTPATIENT)
Dept: GENERAL RADIOLOGY | Age: 72
Discharge: HOME | End: 2022-08-09
Payer: MEDICARE

## 2022-08-09 VITALS
WEIGHT: 123.6 LBS | DIASTOLIC BLOOD PRESSURE: 66 MMHG | HEIGHT: 68 IN | BODY MASS INDEX: 18.73 KG/M2 | SYSTOLIC BLOOD PRESSURE: 104 MMHG

## 2022-08-09 DIAGNOSIS — E55.9 VITAMIN D DEFICIENCY: ICD-10-CM

## 2022-08-09 DIAGNOSIS — M81.0 OSTEOPOROSIS, POSTMENOPAUSAL: Primary | Chronic | ICD-10-CM

## 2022-08-09 DIAGNOSIS — Z51.81 MEDICATION MONITORING ENCOUNTER: ICD-10-CM

## 2022-08-09 DIAGNOSIS — M81.0 OSTEOPOROSIS, POSTMENOPAUSAL: Primary | ICD-10-CM

## 2022-08-09 DIAGNOSIS — M81.0 OSTEOPOROSIS, POSTMENOPAUSAL: ICD-10-CM

## 2022-08-09 PROCEDURE — G8427 DOCREV CUR MEDS BY ELIG CLIN: HCPCS | Performed by: INTERNAL MEDICINE

## 2022-08-09 PROCEDURE — 77080 DXA BONE DENSITY AXIAL: CPT

## 2022-08-09 PROCEDURE — 96372 THER/PROPH/DIAG INJ SC/IM: CPT | Performed by: INTERNAL MEDICINE

## 2022-08-09 PROCEDURE — 1090F PRES/ABSN URINE INCON ASSESS: CPT | Performed by: INTERNAL MEDICINE

## 2022-08-09 PROCEDURE — 1036F TOBACCO NON-USER: CPT | Performed by: INTERNAL MEDICINE

## 2022-08-09 PROCEDURE — 3017F COLORECTAL CA SCREEN DOC REV: CPT | Performed by: INTERNAL MEDICINE

## 2022-08-09 PROCEDURE — G8399 PT W/DXA RESULTS DOCUMENT: HCPCS | Performed by: INTERNAL MEDICINE

## 2022-08-09 PROCEDURE — 77080 DXA BONE DENSITY AXIAL: CPT | Performed by: INTERNAL MEDICINE

## 2022-08-09 PROCEDURE — 99214 OFFICE O/P EST MOD 30 MIN: CPT | Performed by: INTERNAL MEDICINE

## 2022-08-09 PROCEDURE — 1123F ACP DISCUSS/DSCN MKR DOCD: CPT | Performed by: INTERNAL MEDICINE

## 2022-08-09 PROCEDURE — G8420 CALC BMI NORM PARAMETERS: HCPCS | Performed by: INTERNAL MEDICINE

## 2022-08-09 NOTE — PROGRESS NOTES
[facility]) Physicians Osteoporosis and [address] [facility]  [facility] [address], [address]  Phone [phone]  Fax [phone]    NAME: Maxine James  : 1950  CONSULT DATE: 2012  MOST RECENT VISIT: 2021  TODAY'S DATE: 2022    Labs @ [facility] 2022    PROBLEMS. Osteoporosis by DXA 2012, lowest T-score -2.5 in the spine      Family history of osteoporosis, mother with fractured pelvis and arm    BMD decreased 2775-3563, further 8241-4432 and further 3228-3514 at all sites measured    No fractures  Vitamin D deficiency, desirable 25-OH D is 30-60 ng/mL    29 ng/mL 2014    53 ng/mL 2018    48 ng/mL 2018    53 ng/mL 2019    51 ng/mL 2020  Natural menopause age 60 ()  Breast cancer diagnosed , mastectomy  Medullary sponge kidney suspected  HNPP (decreased myelin)    CURRENT MANAGEMENT FOR OSTEOPOROSIS. Calcium, diet 1200 mg/d    300 mg from low calcium foods, 900 mg from oat milk, kale, occ cheese, yogurt  Vitamin D, 1000 IU/d   Exercise, active with daily activities  Pharmacologic therapy, Prolia 60 mg SQ twice yearly started 2019. PREVIOUS MEDICATIONS FOR OSTEOPOROSIS. None    OTHER CURRENT MEDICATIONS. None  OTC MEDICATIONS. Krill oil    CHIEF COMPLAINT. Here for followup of osteoporosis. No new signs or symptoms. INTERVAL HISTORY. See problem list for chronic/inactive conditions. No falls, near-falls or fractures. Feels well overall. She received Prolia without side effects. FOR FULL DETAILS OF FAMILY HISTORY, PAST MEDICAL AND SURGICAL HISTORY, SOCIAL HISTORY, AND REVIEW OF SYSTEMS, SEE PATIENT QUESTIONNAIRE OF TODAY'S DATE. PHYSICAL EXAMINATION. GENERAL. Well-nourished, well-developed, normally proportioned adult. MUSCULOSKELETAL. Spinal contours are normal.  No spine tenderness to palpation or percussion. Two finger spaces between ribs and pelvis. No joint deformity. No edema.  Gait steady without assistance. NEUROLOGICAL. Able to rise from chair without using arms. No apparent focal motor or sensory deficit. Reflexes brisk and symmetric. Coordination appears normal.     BONE DENSITY. Most recent done here using Hologic equipment. T-scores  Initial study: 08/18/2002* spine L1-L2 -0.4 Lowest hip (left total hip)  0.0   Current study: 08/09/2022 spine L1-L2 -2.4 Lowest hip (left fem. neck) -1.7     The table below shows bone mineral density (grams per cm squared). This is the appropriate measure for comparing serial scans. A change declared significant is based a least significant change figure derived from multiple precision studies done at our center according to the ISCD protocol. PA spine Proximal Femur (left)   Date L1-L2 Fem. neck Trochanter Total hip   08/18/2002* 0.936  0.846 0.649 0.940   01/09/2009 0.785 0.740 0.573 0.807   07/21/2015 0.733 0.650 0.501 0.713   03/06/2018 0.693 0.639 0.496 0.707   04/08/2019 0.653 0.645 0.488 0.699   06/29/2020 0.734 0.638 0.508 0.712   07/19/2021 0.718 0.703 0.499 0.717   08/09/2022 0.711 0.700 0.520 0.735   *Done with [employer] equipment, BMD results converted to Hologic units    IMPRESSION:  BONE DENSITY IS LOW, CONSISTENT WITH OSTEOPOROSIS. SINCE THE LAST DXA, BMD DID NOT CHANGE SIGNIFICANTLY IN THE SPINE OR LEFT HIP. COMPARED WITH 2019, BEFORE STARTING PROLIA, BMD IS HIGHER NOW AT ALL SITES MEASURED. Imaging: DXA printouts reviewed. Labs.  03/2014, Ca 9.1, Cr 0.8. 02/2018 Ca 9.1 Cr 0.6. 12/2018 Ca 9.3 Cr 0.7. 03/2019 Ca 9.2 Cr 0.7.  0/2020 Ca 9.4 Cr 0.7. 07/2022 Ca 9.4 Cr 0.7. ASSESSMENT. Osteoporosis with bone density lower than desirable due to her family history of osteoporosis; importantly she has had no fractures. Hip BMD has been stable but spine decreased 3351-2781 and 5863-1198. She is doing well with Prolia started 04/2019. Vitamin D deficiency has been corrected. PLANS.  OK to give Prolia today and continue treatment with

## 2022-08-09 NOTE — RESULT ENCOUNTER NOTE
[facility]) Osteoporosis and [address] [facility] [address] [facility], [address] [place] [address],[place] [address]  Phone [phone]  Fax [phone]    NAME: Giles Alanis   : 1950   STUDY DATE: 2022     REFERRING PROVIDER: Thiago Hollis MD    INDICATION(S) FOR PERFORMING THE STUDY:  osteoporosis, age-related (M81.0)    CLINICAL INFORMATION PROVIDED BY THE PATIENT:  15-year-old woman. She started natural menopause at age 60. No history of fragility fractures. No long-term corticosteroid use. Current treatment is Prolia started 2019. Family history of osteoporosis (mother). EQUIPMENT: Hologic [employer]. POSITIONING: Good. REGIONS OF INTEREST: Correct. ARTIFACTS: None. STUDY VALID? Yes. Spine BMD is spuriously high because of generalized degenerative change; L3 and L4 were deleted. T-scores  Initial study: 2002* spine L1-L2 -0.4 Lowest hip (left total hip)  0.0   Current study: 2022 spine L1-L2 -2.4 Lowest hip (left fem. neck) -1.7     The table below shows bone mineral density (grams per cm squared). This is the appropriate measure for comparing serial scans. A change declared significant is based a least significant change figure derived from multiple precision studies done at our center according to the ISCD protocol. PA spine Proximal Femur (left)   Date L1-L2 Fem. neck Trochanter Total hip   2002* 0.936  0.846 0.649 0.940   2009 0.785 0.740 0.573 0.807   2015 0.733 0.650 0.501 0.713   2018 0.693 0.639 0.496 0.707   2019 0.653 0.645 0.488 0.699   2020 0.734 0.638 0.508 0.712   2021 0.718 0.703 0.499 0.717   2022 0.711 0.700 0.520 0.735   *Done with [email or web address] equipment, BMD results converted to Hologic units    IMPRESSION:  BONE DENSITY IS LOW, CONSISTENT WITH OSTEOPOROSIS.   SINCE THE LAST DXA, BMD DID NOT CHANGE SIGNIFICANTLY IN THE SPINE OR LEFT HIP. COMPARED WITH 2019, BEFORE STARTING PROLIA, BMD IS HIGHER NOW AT ALL SITES MEASURED.      _________________________________________________   Lianet Merritt.  Ruslan BRAXTON, Director, [facility] Osteoporosis and [address] [facility]

## 2023-02-23 ENCOUNTER — NURSE ONLY (OUTPATIENT)
Dept: ENDOCRINOLOGY | Age: 73
End: 2023-02-23
Payer: MEDICARE

## 2023-02-23 DIAGNOSIS — M81.0 OSTEOPOROSIS, POSTMENOPAUSAL: Primary | Chronic | ICD-10-CM

## 2023-02-23 PROCEDURE — 96372 THER/PROPH/DIAG INJ SC/IM: CPT | Performed by: INTERNAL MEDICINE

## 2023-09-18 ENCOUNTER — OFFICE VISIT (OUTPATIENT)
Dept: ENDOCRINOLOGY | Age: 73
End: 2023-09-18
Payer: MEDICARE

## 2023-09-18 ENCOUNTER — HOSPITAL ENCOUNTER (OUTPATIENT)
Dept: GENERAL RADIOLOGY | Age: 73
Discharge: HOME | End: 2023-09-18
Payer: MEDICARE

## 2023-09-18 VITALS
HEIGHT: 68 IN | DIASTOLIC BLOOD PRESSURE: 80 MMHG | TEMPERATURE: 98 F | BODY MASS INDEX: 18.62 KG/M2 | SYSTOLIC BLOOD PRESSURE: 118 MMHG | RESPIRATION RATE: 14 BRPM | HEART RATE: 75 BPM

## 2023-09-18 DIAGNOSIS — M81.0 OSTEOPOROSIS, POSTMENOPAUSAL: Primary | ICD-10-CM

## 2023-09-18 DIAGNOSIS — M81.0 OSTEOPOROSIS, POSTMENOPAUSAL: ICD-10-CM

## 2023-09-18 DIAGNOSIS — E55.9 VITAMIN D DEFICIENCY: ICD-10-CM

## 2023-09-18 DIAGNOSIS — Z51.81 MEDICATION MONITORING ENCOUNTER: ICD-10-CM

## 2023-09-18 PROCEDURE — G8420 CALC BMI NORM PARAMETERS: HCPCS | Performed by: INTERNAL MEDICINE

## 2023-09-18 PROCEDURE — G8399 PT W/DXA RESULTS DOCUMENT: HCPCS | Performed by: INTERNAL MEDICINE

## 2023-09-18 PROCEDURE — 1123F ACP DISCUSS/DSCN MKR DOCD: CPT | Performed by: INTERNAL MEDICINE

## 2023-09-18 PROCEDURE — G8427 DOCREV CUR MEDS BY ELIG CLIN: HCPCS | Performed by: INTERNAL MEDICINE

## 2023-09-18 PROCEDURE — 3017F COLORECTAL CA SCREEN DOC REV: CPT | Performed by: INTERNAL MEDICINE

## 2023-09-18 PROCEDURE — 1036F TOBACCO NON-USER: CPT | Performed by: INTERNAL MEDICINE

## 2023-09-18 PROCEDURE — 1090F PRES/ABSN URINE INCON ASSESS: CPT | Performed by: INTERNAL MEDICINE

## 2023-09-18 PROCEDURE — 96372 THER/PROPH/DIAG INJ SC/IM: CPT | Performed by: INTERNAL MEDICINE

## 2023-09-18 PROCEDURE — 99214 OFFICE O/P EST MOD 30 MIN: CPT | Performed by: INTERNAL MEDICINE

## 2023-09-18 PROCEDURE — 77080 DXA BONE DENSITY AXIAL: CPT

## 2024-03-19 ENCOUNTER — OFFICE VISIT (OUTPATIENT)
Dept: ORTHOPEDIC SURGERY | Age: 74
End: 2024-03-19
Payer: MEDICARE

## 2024-03-19 VITALS — HEIGHT: 68 IN | BODY MASS INDEX: 18.64 KG/M2 | WEIGHT: 123 LBS

## 2024-03-19 DIAGNOSIS — M25.561 RIGHT KNEE PAIN, UNSPECIFIED CHRONICITY: ICD-10-CM

## 2024-03-19 DIAGNOSIS — M71.21 BAKER CYST, RIGHT: ICD-10-CM

## 2024-03-19 DIAGNOSIS — M17.11 LOCALIZED OSTEOARTHRITIS OF RIGHT KNEE: Primary | ICD-10-CM

## 2024-03-19 PROCEDURE — G8399 PT W/DXA RESULTS DOCUMENT: HCPCS | Performed by: PHYSICIAN ASSISTANT

## 2024-03-19 PROCEDURE — G8484 FLU IMMUNIZE NO ADMIN: HCPCS | Performed by: PHYSICIAN ASSISTANT

## 2024-03-19 PROCEDURE — G8427 DOCREV CUR MEDS BY ELIG CLIN: HCPCS | Performed by: PHYSICIAN ASSISTANT

## 2024-03-19 PROCEDURE — 1123F ACP DISCUSS/DSCN MKR DOCD: CPT | Performed by: PHYSICIAN ASSISTANT

## 2024-03-19 PROCEDURE — 3017F COLORECTAL CA SCREEN DOC REV: CPT | Performed by: PHYSICIAN ASSISTANT

## 2024-03-19 PROCEDURE — 1090F PRES/ABSN URINE INCON ASSESS: CPT | Performed by: PHYSICIAN ASSISTANT

## 2024-03-19 PROCEDURE — 1036F TOBACCO NON-USER: CPT | Performed by: PHYSICIAN ASSISTANT

## 2024-03-19 PROCEDURE — G8420 CALC BMI NORM PARAMETERS: HCPCS | Performed by: PHYSICIAN ASSISTANT

## 2024-03-19 PROCEDURE — 99203 OFFICE O/P NEW LOW 30 MIN: CPT | Performed by: PHYSICIAN ASSISTANT

## 2024-03-19 SDOH — HEALTH STABILITY: PHYSICAL HEALTH: ON AVERAGE, HOW MANY DAYS PER WEEK DO YOU ENGAGE IN MODERATE TO STRENUOUS EXERCISE (LIKE A BRISK WALK)?: 5 DAYS

## 2024-03-19 SDOH — HEALTH STABILITY: PHYSICAL HEALTH: ON AVERAGE, HOW MANY MINUTES DO YOU ENGAGE IN EXERCISE AT THIS LEVEL?: 60 MIN

## 2024-03-20 ENCOUNTER — NURSE ONLY (OUTPATIENT)
Dept: ENDOCRINOLOGY | Age: 74
End: 2024-03-20
Payer: MEDICARE

## 2024-03-20 DIAGNOSIS — M81.0 OSTEOPOROSIS, POSTMENOPAUSAL: Primary | ICD-10-CM

## 2024-03-20 PROCEDURE — 96372 THER/PROPH/DIAG INJ SC/IM: CPT | Performed by: INTERNAL MEDICINE

## 2024-03-20 NOTE — PROGRESS NOTES
Date:  3/19/2024    Name:  Flora Hernandez  Address:  [address] [facility] [address]    :  1950      Age:   73 y.o.        Chief Complaint    Knee Pain (Np R knee)      History of Present Illness:  Flora Hernandez is a 73 y.o. female who presents for evaluation of her right knee pain.  This is a previous patient of Dr. Ken Moody who was last seen in .  She has a past medical history of a Baker's cyst in the right knee.  She gets mild knee discomfort after she went to [place].  She likes to walk and goes on 4 mile walks with the group of friends.  In addition she was gardening last week which included kneeling.  She notes mild achy discomfort mostly in the patellofemoral joint and in the popliteal space.  She feels an increase sensation of pressure with prolonged ambulating.  She denies any mechanical symptoms or sensations of instability.  Conservative treatment thus far has included: rest, ice, elevation, activity modification, and OTC medications as needed. The patient denies any new injury.  The patient denies any catching, giving way, joint locking, numbness, paresthesias, or weakness.             Past Medical History:   Diagnosis Date    Arthritis     Cancer (HCC)     LCIS    Hereditary neuropathy with pressure palsies (HNPP)     SON AND DAUGHTER BOTH HAVE    HNPP (hereditary neuropathy with predisposition to pressure palsy)         Past Surgical History:   Procedure Laterality Date    BREAST SURGERY      bilateral mastectomies     COLONOSCOPY         No family history on file.    Social History     Socioeconomic History    Marital status:      Spouse name: None    Number of children: None    Years of education: None    Highest education level: None   Tobacco Use    Smoking status: Never    Smokeless tobacco: Never   Substance and Sexual Activity    Alcohol use: Yes     Comment: very little, socially    Drug use: No     Social Determinants of Health     Physical Activity:

## 2024-09-09 ENCOUNTER — TELEPHONE (OUTPATIENT)
Dept: ENDOCRINOLOGY | Age: 74
End: 2024-09-09

## 2024-10-07 ENCOUNTER — HOSPITAL ENCOUNTER (OUTPATIENT)
Dept: GENERAL RADIOLOGY | Age: 74
Discharge: HOME | End: 2024-10-07
Payer: MEDICARE

## 2024-10-07 ENCOUNTER — OFFICE VISIT (OUTPATIENT)
Dept: ENDOCRINOLOGY | Age: 74
End: 2024-10-07
Payer: MEDICARE

## 2024-10-07 ENCOUNTER — TELEPHONE (OUTPATIENT)
Dept: ENDOCRINOLOGY | Age: 74
End: 2024-10-07

## 2024-10-07 VITALS — BODY MASS INDEX: 18.04 KG/M2 | HEIGHT: 68 IN | WEIGHT: 119 LBS

## 2024-10-07 DIAGNOSIS — M81.0 OSTEOPOROSIS, POSTMENOPAUSAL: Primary | ICD-10-CM

## 2024-10-07 DIAGNOSIS — M81.0 OSTEOPOROSIS, POSTMENOPAUSAL: ICD-10-CM

## 2024-10-07 DIAGNOSIS — E55.9 VITAMIN D DEFICIENCY: ICD-10-CM

## 2024-10-07 DIAGNOSIS — Z51.81 MEDICATION MONITORING ENCOUNTER: ICD-10-CM

## 2024-10-07 PROCEDURE — G8399 PT W/DXA RESULTS DOCUMENT: HCPCS | Performed by: INTERNAL MEDICINE

## 2024-10-07 PROCEDURE — 77080 DXA BONE DENSITY AXIAL: CPT

## 2024-10-07 PROCEDURE — G8484 FLU IMMUNIZE NO ADMIN: HCPCS | Performed by: INTERNAL MEDICINE

## 2024-10-07 PROCEDURE — 1090F PRES/ABSN URINE INCON ASSESS: CPT | Performed by: INTERNAL MEDICINE

## 2024-10-07 PROCEDURE — 1123F ACP DISCUSS/DSCN MKR DOCD: CPT | Performed by: INTERNAL MEDICINE

## 2024-10-07 PROCEDURE — 99214 OFFICE O/P EST MOD 30 MIN: CPT | Performed by: INTERNAL MEDICINE

## 2024-10-07 PROCEDURE — G8419 CALC BMI OUT NRM PARAM NOF/U: HCPCS | Performed by: INTERNAL MEDICINE

## 2024-10-07 PROCEDURE — G8427 DOCREV CUR MEDS BY ELIG CLIN: HCPCS | Performed by: INTERNAL MEDICINE

## 2024-10-07 PROCEDURE — 96372 THER/PROPH/DIAG INJ SC/IM: CPT | Performed by: INTERNAL MEDICINE

## 2024-10-07 PROCEDURE — 1036F TOBACCO NON-USER: CPT | Performed by: INTERNAL MEDICINE

## 2024-10-07 RX ORDER — ESTRADIOL 2 MG/1
SYSTEM VAGINAL
COMMUNITY
Start: 2024-07-04

## 2024-10-07 NOTE — TELEPHONE ENCOUNTER
Pt called in asking what the name of the Dr  was that you mentioned to her during her apt, about her walking. She stated it was a [facility].   She also wanted to know if you would be sending there referral over so she could be seen if able. Please contact the pt and advise what she needs to do.  Pt Ph [phone]

## 2024-10-07 NOTE — TELEPHONE ENCOUNTER
She is seeing her foot specialist, Dr. Oneill, soon. She should ask him if she needs further evaluation.  If so, he should be the one to make the referral.

## 2024-10-07 NOTE — PROGRESS NOTES
[facility] Physicians Osteoporosis and Bone Health Services  [address] [facility] [address] [place] [address]  Phone [phone]  Fax [phone]    NAME: CLEM JUARES  : 1950  CONSULT DATE: 2012  MOST RECENT VISIT: 2023  TODAY'S DATE: 10/07/2024    Labs @ [facility] 2022    PROBLEMS.  Osteoporosis by DXA 2012, lowest T-score -2.5 in the spine      Family history of osteoporosis, mother with fractured pelvis and arm    BMD decreased 1250-8549, further 3496-1795 and further 9246-9933 at all sites measured    No fractures  Vitamin D deficiency, desirable 25-OH D is 30-60 ng/mL    29 ng/mL 2014    53 ng/mL 2018    48 ng/mL 2018    53 ng/mL 2019    51 ng/mL 2020  Natural menopause age 56 ()  Breast cancer diagnosed , mastectomy  Medullary sponge kidney suspected  HNPP (decreased myelin)    CURRENT MANAGEMENT FOR OSTEOPOROSIS.  Calcium, diet 1200 mg/d    300 mg from low calcium foods, 900 mg from oat milk, kale, occ cheese, yogurt  Vitamin D, 1000 IU/d   Exercise, active with daily activities  Pharmacologic therapy, Prolia 60 mg SQ twice yearly started 2019    PREVIOUS MEDICATIONS FOR OSTEOPOROSIS. None    OTHER CURRENT MEDICATIONS. None  OTC MEDICATIONS. Krill oil    CHIEF COMPLAINT. Here for followup of osteoporosis. No new signs or symptoms.    INTERVAL HISTORY.  See problem list for chronic/inactive conditions.  She received Prolia without side effects. No falls, near-falls or fractures.  Feels well overall.      FOR FULL DETAILS OF FAMILY HISTORY, PAST MEDICAL AND SURGICAL HISTORY, SOCIAL HISTORY, SEE PATIENT QUESTIONNAIRE OF TODAY'S DATE.    PHYSICAL EXAMINATION.   GENERAL.  Well-nourished, well-developed, normally proportioned adult.   MUSCULOSKELETAL. Spinal contours are normal.  No spine tenderness to palpation or percussion.   Two finger spaces between ribs and pelvis. No joint deformity.  No edema. Gait steady without assistance.

## 2024-10-08 NOTE — TELEPHONE ENCOUNTER
Not exactly. I mentioned that [facility] has a Gait Analysis clinic. She sees Dr. Oneill for foot problems. It's Dr. Oneill who would know if that is appropriate for her and, if so, would make the referral.

## 2024-10-22 ENCOUNTER — TELEPHONE (OUTPATIENT)
Dept: ENDOCRINOLOGY | Age: 74
End: 2024-10-22

## 2025-01-22 ENCOUNTER — OFFICE VISIT (OUTPATIENT)
Dept: SURGERY | Age: 75
End: 2025-01-22
Payer: MEDICARE

## 2025-01-22 VITALS — HEART RATE: 100 BPM | SYSTOLIC BLOOD PRESSURE: 124 MMHG | DIASTOLIC BLOOD PRESSURE: 93 MMHG

## 2025-01-22 DIAGNOSIS — R15.1 FECAL SMEARING: ICD-10-CM

## 2025-01-22 DIAGNOSIS — K64.2 GRADE III HEMORRHOIDS: Primary | ICD-10-CM

## 2025-01-22 DIAGNOSIS — N81.6 RECTOCELE: ICD-10-CM

## 2025-01-22 PROCEDURE — G8399 PT W/DXA RESULTS DOCUMENT: HCPCS | Performed by: SURGERY

## 2025-01-22 PROCEDURE — 1159F MED LIST DOCD IN RCRD: CPT | Performed by: SURGERY

## 2025-01-22 PROCEDURE — 1123F ACP DISCUSS/DSCN MKR DOCD: CPT | Performed by: SURGERY

## 2025-01-22 PROCEDURE — 1090F PRES/ABSN URINE INCON ASSESS: CPT | Performed by: SURGERY

## 2025-01-22 PROCEDURE — 99204 OFFICE O/P NEW MOD 45 MIN: CPT | Performed by: SURGERY

## 2025-01-22 PROCEDURE — 1036F TOBACCO NON-USER: CPT | Performed by: SURGERY

## 2025-01-22 PROCEDURE — G8419 CALC BMI OUT NRM PARAM NOF/U: HCPCS | Performed by: SURGERY

## 2025-01-22 PROCEDURE — G8427 DOCREV CUR MEDS BY ELIG CLIN: HCPCS | Performed by: SURGERY

## 2025-01-22 NOTE — PROGRESS NOTES
[facility] PHYSICIANS [place] SPECIAL[facility]ARE [facility] COLORECTAL SURGERY  [address] [place] [address]  [address]  [place] [address]  Dept: [phone]  Dept Fax: [phone]  Loc: [phone]    Visit Date: 1/22/2025    Flora Hernandez is a 74 y.o. female who presents today for: New Patient (Hemorrhoids. Patient denies Pain, Patient denies bleeding, Patient states Constipation)      HPI:       Flora Hernandez is a 74 y.o. female referred  for hemorrhoids.    Patient has had several years of hemorrhoids symptoms.   Main complaints include: Prolapsing tissue, incomplete evacuation, fecal smearing  Previous treatment: yes -pelvic floor physical therapy in the past    Past Medical History:   Diagnosis Date    Arthritis     Cancer (HCC)     LCIS    Hereditary neuropathy with pressure palsies (HNPP)     SON AND DAUGHTER BOTH HAVE    HNPP (hereditary neuropathy with predisposition to pressure palsy)        Social History:   Social History     Tobacco Use    Smoking status: Never    Smokeless tobacco: Never   Substance Use Topics    Alcohol use: Yes     Comment: very little, socially      Tobacco cessation counseling provided as appropriate.    No colonoscopy on file   No cologuard on file  No FIT/FOBT on file   No flexible sigmoidoscopy on file    Objective:     Physical Exam   BP (!) 124/93   Pulse 100   Constitutional: Appears well-developed and well-nourished. Grooming appropriate. No gross deformities. There is no height or weight on file to calculate BMI.    Anorectal exam:    ANOSCOPY:    Chaperone/MA present in room during entire exam. Patient was placed in left lateral down or knee-chest positioning depending on patient comfort. Exam table manipulated for proper visualization and lighting. Buttocks spread.     Inspection reveals: no perianal skin lesions, excoriation. External hemorrhoids/tags: no .     Digital exam performed with lubricated index finger revealing: no masses, induration, or

## 2025-02-19 ENCOUNTER — OFFICE VISIT (OUTPATIENT)
Dept: SURGERY | Age: 75
End: 2025-02-19
Payer: MEDICARE

## 2025-02-19 DIAGNOSIS — K64.2 GRADE III HEMORRHOIDS: Primary | ICD-10-CM

## 2025-02-19 PROCEDURE — 46221 LIGATION OF HEMORRHOID(S): CPT | Performed by: SURGERY

## 2025-02-19 NOTE — PROGRESS NOTES
INTERNAL HEMORRHOID RUBBER BAND LIGATION PROCEDURE NOTE:    Patient presented with symptomatic internal hemorrhoids unresponsive to conservative management. See previous office notes for details of previous history and treatments.     Risks of rubber band ligation explained to patient, including but not limited to: immediate and delayed bleeding, pain, infection, external hemorrhoids thrombosis, pelvic sepsis, urinary retention, sphincter dysfunction, need for additional procedures, and recurrence. Patient was previously provided with information in office AVS (after visit summary) and given opportunity to ask any questions and bring up any concerns.     Chaperone/MA present in room during entire procedure.    Patient was placed in either lateral or knee-chest positioning depending on patient preference. Exam table manipulated for proper visualization and lighting. Buttocks spread. Digital exam and anoscopy performed confirming internal hemorrhoids.    Suction rubber band ligator used to place band in 3 positions, 1 cm proximal to the dentate line, at the apex of the internal hemorrhoid.    Anoscope removed. Patient tolerated the procedure well without complication. EBL minimal. Post procedure expectations and instructions explained to patient. Written instructions provided as well.    Disposition: Follow up in 4 weeks for symptom reassessment.    Electronically signed by Toby Leigh MD on 2/19/2025 at 1:51 PM

## 2025-03-13 ENCOUNTER — TELEPHONE (OUTPATIENT)
Dept: ENDOCRINOLOGY | Age: 75
End: 2025-03-13

## 2025-04-09 ENCOUNTER — TELEPHONE (OUTPATIENT)
Dept: ENDOCRINOLOGY | Age: 75
End: 2025-04-09

## 2025-04-09 NOTE — TELEPHONE ENCOUNTER
Pt came in for prolia and had new insurance. Added into epic and will run SOB and will call pt to [facility]

## 2025-04-10 ENCOUNTER — TELEPHONE (OUTPATIENT)
Dept: ENDOCRINOLOGY | Age: 75
End: 2025-04-10

## 2025-04-10 NOTE — TELEPHONE ENCOUNTER
Spoke to [employer] to submit new insurance to verify Prolia. Patient will be called when approved.

## 2025-04-15 ENCOUNTER — OFFICE VISIT (OUTPATIENT)
Dept: SURGERY | Age: 75
End: 2025-04-15
Payer: MEDICARE

## 2025-04-15 ENCOUNTER — CLINICAL SUPPORT (OUTPATIENT)
Dept: ENDOCRINOLOGY | Age: 75
End: 2025-04-15
Payer: MEDICARE

## 2025-04-15 DIAGNOSIS — N81.6 RECTOCELE: ICD-10-CM

## 2025-04-15 DIAGNOSIS — M81.0 OSTEOPOROSIS, POSTMENOPAUSAL: Primary | ICD-10-CM

## 2025-04-15 DIAGNOSIS — K64.2 GRADE III HEMORRHOIDS: Primary | ICD-10-CM

## 2025-04-15 PROCEDURE — 46221 LIGATION OF HEMORRHOID(S): CPT | Performed by: SURGERY

## 2025-04-15 PROCEDURE — 96372 THER/PROPH/DIAG INJ SC/IM: CPT | Performed by: INTERNAL MEDICINE

## 2025-04-15 NOTE — PROGRESS NOTES
INTERNAL HEMORRHOID RUBBER BAND LIGATION PROCEDURE NOTE:    Patient presented with symptomatic internal hemorrhoids unresponsive to conservative management. See previous office notes for details of previous history and treatments.     Risks of rubber band ligation explained to patient, including but not limited to: immediate and delayed bleeding, pain, infection, external hemorrhoids thrombosis, pelvic sepsis, urinary retention, sphincter dysfunction, need for additional procedures, and recurrence. Patient was previously provided with information in office AVS (after visit summary) and given opportunity to ask any questions and bring up any concerns.     Chaperone/MA present in room during entire procedure.    Patient was placed in either lateral or knee-chest positioning depending on patient preference. Exam table manipulated for proper visualization and lighting. Buttocks spread. Digital exam and anoscopy performed confirming internal hemorrhoids.    Suction rubber band ligator used to place band in 2 positions, 1 cm proximal to the dentate line, at the apex of the internal hemorrhoid.    Anoscope removed. Patient tolerated the procedure well without complication. EBL minimal. Post procedure expectations and instructions explained to patient. Written instructions provided as well.    Disposition: Follow up in 4 weeks for symptom reassessment.    Electronically signed by Toby Leigh MD on 4/15/2025 at 1:23 PM

## 2025-06-11 ENCOUNTER — OFFICE VISIT (OUTPATIENT)
Dept: SURGERY | Age: 75
End: 2025-06-11
Payer: MEDICARE

## 2025-06-11 DIAGNOSIS — K64.2 GRADE III HEMORRHOIDS: Primary | ICD-10-CM

## 2025-06-11 PROCEDURE — 46221 LIGATION OF HEMORRHOID(S): CPT | Performed by: SURGERY

## 2025-06-11 NOTE — PROGRESS NOTES
INTERNAL HEMORRHOID RUBBER BAND LIGATION PROCEDURE NOTE:    Patient presented with symptomatic internal hemorrhoids unresponsive to conservative management. See previous office notes for details of previous history and treatments.     Risks of rubber band ligation explained to patient, including but not limited to: immediate and delayed bleeding, pain, infection, external hemorrhoids thrombosis, pelvic sepsis, urinary retention, sphincter dysfunction, need for additional procedures, and recurrence. Patient was previously provided with information in office AVS (after visit summary) and given opportunity to ask any questions and bring up any concerns.     Chaperone/MA present in room during entire procedure.    Patient was placed in either lateral or knee-chest positioning depending on patient preference. Exam table manipulated for proper visualization and lighting. Buttocks spread. Digital exam and anoscopy performed confirming internal hemorrhoids.    Suction rubber band ligator used to place band in 2 positions, 1 cm proximal to the dentate line, at the apex of the internal hemorrhoid.    Anoscope removed. Patient tolerated the procedure well without complication. EBL minimal. Post procedure expectations and instructions explained to patient. Written instructions provided as well.    Disposition: Follow up PRN    Electronically signed by Toby Leigh MD on 6/11/2025 at 1:44 PM